# Patient Record
Sex: FEMALE | Race: BLACK OR AFRICAN AMERICAN | NOT HISPANIC OR LATINO
[De-identification: names, ages, dates, MRNs, and addresses within clinical notes are randomized per-mention and may not be internally consistent; named-entity substitution may affect disease eponyms.]

---

## 2017-10-15 PROBLEM — Z00.00 ENCOUNTER FOR PREVENTIVE HEALTH EXAMINATION: Status: ACTIVE | Noted: 2017-10-15

## 2017-11-14 ENCOUNTER — APPOINTMENT (OUTPATIENT)
Dept: DERMATOLOGY | Facility: CLINIC | Age: 57
End: 2017-11-14
Payer: COMMERCIAL

## 2017-11-14 PROCEDURE — 99203 OFFICE O/P NEW LOW 30 MIN: CPT

## 2018-07-12 ENCOUNTER — APPOINTMENT (OUTPATIENT)
Dept: OBGYN | Facility: CLINIC | Age: 58
End: 2018-07-12
Payer: COMMERCIAL

## 2018-07-12 VITALS
HEART RATE: 59 BPM | DIASTOLIC BLOOD PRESSURE: 70 MMHG | SYSTOLIC BLOOD PRESSURE: 138 MMHG | HEIGHT: 61 IN | BODY MASS INDEX: 31.26 KG/M2 | WEIGHT: 165.56 LBS

## 2018-07-12 DIAGNOSIS — Z78.9 OTHER SPECIFIED HEALTH STATUS: ICD-10-CM

## 2018-07-12 DIAGNOSIS — Z83.3 FAMILY HISTORY OF DIABETES MELLITUS: ICD-10-CM

## 2018-07-12 DIAGNOSIS — Z82.49 FAMILY HISTORY OF ISCHEMIC HEART DISEASE AND OTHER DISEASES OF THE CIRCULATORY SYSTEM: ICD-10-CM

## 2018-07-12 PROCEDURE — 99214 OFFICE O/P EST MOD 30 MIN: CPT

## 2018-07-13 LAB
C TRACH RRNA SPEC QL NAA+PROBE: NOT DETECTED
HPV HIGH+LOW RISK DNA PNL CVX: NOT DETECTED
N GONORRHOEA RRNA SPEC QL NAA+PROBE: NOT DETECTED
SOURCE AMPLIFICATION: NORMAL

## 2018-07-16 LAB — CYTOLOGY CVX/VAG DOC THIN PREP: NORMAL

## 2018-08-09 ENCOUNTER — APPOINTMENT (OUTPATIENT)
Dept: OBGYN | Facility: CLINIC | Age: 58
End: 2018-08-09
Payer: COMMERCIAL

## 2018-08-09 ENCOUNTER — RESULT REVIEW (OUTPATIENT)
Age: 58
End: 2018-08-09

## 2018-08-09 ENCOUNTER — ASOB RESULT (OUTPATIENT)
Age: 58
End: 2018-08-09

## 2018-08-09 DIAGNOSIS — N63.20 UNSPECIFIED LUMP IN THE LEFT BREAST, UNSPECIFIED QUADRANT: ICD-10-CM

## 2018-08-09 PROCEDURE — 76857 US EXAM PELVIC LIMITED: CPT

## 2018-08-09 PROCEDURE — 76830 TRANSVAGINAL US NON-OB: CPT

## 2018-08-30 ENCOUNTER — APPOINTMENT (OUTPATIENT)
Dept: OBGYN | Facility: CLINIC | Age: 58
End: 2018-08-30
Payer: COMMERCIAL

## 2018-08-30 VITALS
BODY MASS INDEX: 32.03 KG/M2 | DIASTOLIC BLOOD PRESSURE: 79 MMHG | SYSTOLIC BLOOD PRESSURE: 143 MMHG | HEART RATE: 63 BPM | WEIGHT: 169.5 LBS

## 2018-08-30 DIAGNOSIS — D25.9 LEIOMYOMA OF UTERUS, UNSPECIFIED: ICD-10-CM

## 2018-08-30 DIAGNOSIS — N95.0 POSTMENOPAUSAL BLEEDING: ICD-10-CM

## 2018-08-30 PROCEDURE — 99213 OFFICE O/P EST LOW 20 MIN: CPT

## 2018-09-06 ENCOUNTER — APPOINTMENT (OUTPATIENT)
Dept: OBGYN | Facility: CLINIC | Age: 58
End: 2018-09-06
Payer: COMMERCIAL

## 2018-09-07 ENCOUNTER — OUTPATIENT (OUTPATIENT)
Dept: OUTPATIENT SERVICES | Facility: HOSPITAL | Age: 58
LOS: 1 days | End: 2018-09-07
Payer: COMMERCIAL

## 2018-09-07 VITALS
HEIGHT: 64 IN | SYSTOLIC BLOOD PRESSURE: 151 MMHG | TEMPERATURE: 98 F | RESPIRATION RATE: 16 BRPM | DIASTOLIC BLOOD PRESSURE: 84 MMHG | WEIGHT: 169.76 LBS | HEART RATE: 52 BPM

## 2018-09-07 DIAGNOSIS — Z29.9 ENCOUNTER FOR PROPHYLACTIC MEASURES, UNSPECIFIED: ICD-10-CM

## 2018-09-07 DIAGNOSIS — Z01.818 ENCOUNTER FOR OTHER PREPROCEDURAL EXAMINATION: ICD-10-CM

## 2018-09-07 DIAGNOSIS — N92.0 EXCESSIVE AND FREQUENT MENSTRUATION WITH REGULAR CYCLE: ICD-10-CM

## 2018-09-07 LAB
ANION GAP SERPL CALC-SCNC: 11 MMOL/L — SIGNIFICANT CHANGE UP (ref 5–17)
ANISOCYTOSIS BLD QL: SLIGHT — SIGNIFICANT CHANGE UP
APTT BLD: 25.8 SEC — LOW (ref 27.5–37.4)
BASOPHILS # BLD AUTO: 0 K/UL — SIGNIFICANT CHANGE UP (ref 0–0.2)
BASOPHILS NFR BLD AUTO: 1 % — SIGNIFICANT CHANGE UP (ref 0–2)
BLD GP AB SCN SERPL QL: SIGNIFICANT CHANGE UP
BUN SERPL-MCNC: 14 MG/DL — SIGNIFICANT CHANGE UP (ref 8–20)
CALCIUM SERPL-MCNC: 9 MG/DL — SIGNIFICANT CHANGE UP (ref 8.6–10.2)
CHLORIDE SERPL-SCNC: 105 MMOL/L — SIGNIFICANT CHANGE UP (ref 98–107)
CO2 SERPL-SCNC: 24 MMOL/L — SIGNIFICANT CHANGE UP (ref 22–29)
CREAT SERPL-MCNC: 0.92 MG/DL — SIGNIFICANT CHANGE UP (ref 0.5–1.3)
ELLIPTOCYTES BLD QL SMEAR: SLIGHT — SIGNIFICANT CHANGE UP
EOSINOPHIL # BLD AUTO: 0.2 K/UL — SIGNIFICANT CHANGE UP (ref 0–0.5)
EOSINOPHIL NFR BLD AUTO: 4 % — SIGNIFICANT CHANGE UP (ref 0–5)
GLUCOSE SERPL-MCNC: 102 MG/DL — SIGNIFICANT CHANGE UP (ref 70–115)
HBA1C BLD-MCNC: 5.7 % — HIGH (ref 4–5.6)
HCT VFR BLD CALC: 29.8 % — LOW (ref 37–47)
HGB BLD-MCNC: 8.4 G/DL — LOW (ref 12–16)
HYPOCHROMIA BLD QL: SLIGHT — SIGNIFICANT CHANGE UP
INR BLD: 1.01 RATIO — SIGNIFICANT CHANGE UP (ref 0.88–1.16)
LYMPHOCYTES # BLD AUTO: 1.6 K/UL — SIGNIFICANT CHANGE UP (ref 1–4.8)
LYMPHOCYTES # BLD AUTO: 37 % — SIGNIFICANT CHANGE UP (ref 20–55)
MACROCYTES BLD QL: SLIGHT — SIGNIFICANT CHANGE UP
MCHC RBC-ENTMCNC: 18.6 PG — LOW (ref 27–31)
MCHC RBC-ENTMCNC: 28.2 G/DL — LOW (ref 32–36)
MCV RBC AUTO: 65.9 FL — LOW (ref 81–99)
MICROCYTES BLD QL: SIGNIFICANT CHANGE UP
MONOCYTES # BLD AUTO: 0.4 K/UL — SIGNIFICANT CHANGE UP (ref 0–0.8)
MONOCYTES NFR BLD AUTO: 8 % — SIGNIFICANT CHANGE UP (ref 3–10)
NEUTROPHILS # BLD AUTO: 2.1 K/UL — SIGNIFICANT CHANGE UP (ref 1.8–8)
NEUTROPHILS NFR BLD AUTO: 49 % — SIGNIFICANT CHANGE UP (ref 37–73)
OVALOCYTES BLD QL SMEAR: SLIGHT — SIGNIFICANT CHANGE UP
PLAT MORPH BLD: NORMAL — SIGNIFICANT CHANGE UP
PLATELET # BLD AUTO: 416 K/UL — HIGH (ref 150–400)
POIKILOCYTOSIS BLD QL AUTO: SLIGHT — SIGNIFICANT CHANGE UP
POTASSIUM SERPL-MCNC: 4.2 MMOL/L — SIGNIFICANT CHANGE UP (ref 3.5–5.3)
POTASSIUM SERPL-SCNC: 4.2 MMOL/L — SIGNIFICANT CHANGE UP (ref 3.5–5.3)
PROTHROM AB SERPL-ACNC: 11.1 SEC — SIGNIFICANT CHANGE UP (ref 9.8–12.7)
RBC # BLD: 4.52 M/UL — SIGNIFICANT CHANGE UP (ref 4.4–5.2)
RBC # FLD: 19.8 % — HIGH (ref 11–15.6)
RBC BLD AUTO: ABNORMAL
SCHISTOCYTES BLD QL AUTO: SIGNIFICANT CHANGE UP
SODIUM SERPL-SCNC: 140 MMOL/L — SIGNIFICANT CHANGE UP (ref 135–145)
TYPE + AB SCN PNL BLD: SIGNIFICANT CHANGE UP
VARIANT LYMPHS # BLD: 1 % — SIGNIFICANT CHANGE UP (ref 0–6)
WBC # BLD: 4.3 K/UL — LOW (ref 4.8–10.8)
WBC # FLD AUTO: 4.3 K/UL — LOW (ref 4.8–10.8)

## 2018-09-07 PROCEDURE — 93010 ELECTROCARDIOGRAM REPORT: CPT

## 2018-09-07 RX ORDER — CEFAZOLIN SODIUM 1 G
2000 VIAL (EA) INJECTION ONCE
Qty: 0 | Refills: 0 | Status: DISCONTINUED | OUTPATIENT
Start: 2018-09-11 | End: 2018-09-13

## 2018-09-07 NOTE — H&P PST ADULT - HISTORY OF PRESENT ILLNESS
57 year old female 57 year old female who states that she has heavy menstrual periods and a recent sonogram shows

## 2018-09-07 NOTE — PATIENT PROFILE ADULT. - LEARNING ASSESSMENT (PATIENT) ADDITIONAL COMMENTS
Instructed pt on pre-op instructions, tips for safer surgery, pain management scale, pre-surgical infection prevention, verbalized understanding of all instructions given.

## 2018-09-07 NOTE — H&P PST ADULT - LAB RESULTS AND INTERPRETATION
9-7-18: 8.4/29.8 H& H called and informed Dr. Castillo will type and cross match for 2 units for DOP

## 2018-09-07 NOTE — H&P PST ADULT - NSANTHOSAYNRD_GEN_A_CORE
No. ABIEL screening performed.  STOP BANG Legend: 0-2 = LOW Risk; 3-4 = INTERMEDIATE Risk; 5-8 = HIGH Risk

## 2018-09-07 NOTE — H&P PST ADULT - ASSESSMENT
CAPRINI SCORE [CLOT]    AGE RELATED RISK FACTORS                                                       MOBILITY RELATED FACTORS  [ ] Age 41-60 years                                            (1 Point)                  [ ] Bed rest                                                        (1 Point)  [ ] Age: 61-74 years                                           (2 Points)                 [ ] Plaster cast                                                   (2 Points)  [ ] Age= 75 years                                              (3 Points)                 [ ] Bed bound for more than 72 hours                 (2 Points)    DISEASE RELATED RISK FACTORS                                               GENDER SPECIFIC FACTORS  [ ] Edema in the lower extremities                       (1 Point)                  [ ] Pregnancy                                                     (1 Point)  [ ] Varicose veins                                               (1 Point)                  [ ] Post-partum < 6 weeks                                   (1 Point)             [ ] BMI > 25 Kg/m2                                            (1 Point)                  [ ] Hormonal therapy  or oral contraception          (1 Point)                 [ ] Sepsis (in the previous month)                        (1 Point)                  [ ] History of pregnancy complications                 (1 point)  [ ] Pneumonia or serious lung disease                                               [ ] Unexplained or recurrent                     (1 Point)           (in the previous month)                               (1 Point)  [ ] Abnormal pulmonary function test                     (1 Point)                 SURGERY RELATED RISK FACTORS  [ ] Acute myocardial infarction                              (1 Point)                 [ ]  Section                                             (1 Point)  [ ] Congestive heart failure (in the previous month)  (1 Point)               [ ] Minor surgery                                                  (1 Point)   [ ] Inflammatory bowel disease                             (1 Point)                 [ ] Arthroscopic surgery                                        (2 Points)  [ ] Central venous access                                      (2 Points)                [ ] General surgery lasting more than 45 minutes   (2 Points)       [ ] Stroke (in the previous month)                          (5 Points)               [ ] Elective arthroplasty                                         (5 Points)                                                                                                                                               HEMATOLOGY RELATED FACTORS                                                 TRAUMA RELATED RISK FACTORS  [ ] Prior episodes of VTE                                     (3 Points)                 [ ] Fracture of the hip, pelvis, or leg                       (5 Points)  [ ] Positive family history for VTE                         (3 Points)                 [ ] Acute spinal cord injury (in the previous month)  (5 Points)  [ ] Prothrombin 43037 A                                     (3 Points)                 [ ] Paralysis  (less than 1 month)                             (5 Points)  [ ] Factor V Leiden                                             (3 Points)                  [ ] Multiple Trauma within 1 month                        (5 Points)  [ ] Lupus anticoagulants                                     (3 Points)                                                           [ ] Anticardiolipin antibodies                               (3 Points)                                                       [ ] High homocysteine in the blood                      (3 Points)                                             [ ] Other congenital or acquired thrombophilia      (3 Points)                                                [ ] Heparin induced thrombocytopenia                  (3 Points)                                          Total Score [          ] CAPRINI SCORE [CLOT]    AGE RELATED RISK FACTORS                                                       MOBILITY RELATED FACTORS  [x ] Age 41-60 years                                            (1 Point)                  [ ] Bed rest                                                        (1 Point)  [ ] Age: 61-74 years                                           (2 Points)                 [ ] Plaster cast                                                   (2 Points)  [ ] Age= 75 years                                              (3 Points)                 [ ] Bed bound for more than 72 hours                 (2 Points)    DISEASE RELATED RISK FACTORS                                               GENDER SPECIFIC FACTORS  [ ] Edema in the lower extremities                       (1 Point)                  [ ] Pregnancy                                                     (1 Point)  [ ] Varicose veins                                               (1 Point)                  [ ] Post-partum < 6 weeks                                   (1 Point)             [x ] BMI > 25 Kg/m2                                            (1 Point)                  [ ] Hormonal therapy  or oral contraception          (1 Point)                 [ ] Sepsis (in the previous month)                        (1 Point)                  [ ] History of pregnancy complications                 (1 point)  [ ] Pneumonia or serious lung disease                                               [ ] Unexplained or recurrent                     (1 Point)           (in the previous month)                               (1 Point)  [ ] Abnormal pulmonary function test                     (1 Point)                 SURGERY RELATED RISK FACTORS  [ ] Acute myocardial infarction                              (1 Point)                 [ ]  Section                                             (1 Point)  [ ] Congestive heart failure (in the previous month)  (1 Point)               [ ] Minor surgery                                                  (1 Point)   [ ] Inflammatory bowel disease                             (1 Point)                 [ ] Arthroscopic surgery                                        (2 Points)  [ ] Central venous access                                      (2 Points)                [x ] General surgery lasting more than 45 minutes   (2 Points)       [ ] Stroke (in the previous month)                          (5 Points)               [ ] Elective arthroplasty                                         (5 Points)                                                                                                                                               HEMATOLOGY RELATED FACTORS                                                 TRAUMA RELATED RISK FACTORS  [ ] Prior episodes of VTE                                     (3 Points)                 [ ] Fracture of the hip, pelvis, or leg                       (5 Points)  [ ] Positive family history for VTE                         (3 Points)                 [ ] Acute spinal cord injury (in the previous month)  (5 Points)  [ ] Prothrombin 67076 A                                     (3 Points)                 [ ] Paralysis  (less than 1 month)                             (5 Points)  [ ] Factor V Leiden                                             (3 Points)                  [ ] Multiple Trauma within 1 month                        (5 Points)  [ ] Lupus anticoagulants                                     (3 Points)                                                           [ ] Anticardiolipin antibodies                               (3 Points)                                                       [ ] High homocysteine in the blood                      (3 Points)                                             [ ] Other congenital or acquired thrombophilia      (3 Points)                                                [ ] Heparin induced thrombocytopenia                  (3 Points)                                          Total Score [       4   ]

## 2018-09-10 ENCOUNTER — TRANSCRIPTION ENCOUNTER (OUTPATIENT)
Age: 58
End: 2018-09-10

## 2018-09-11 ENCOUNTER — RESULT REVIEW (OUTPATIENT)
Age: 58
End: 2018-09-11

## 2018-09-11 ENCOUNTER — INPATIENT (INPATIENT)
Facility: HOSPITAL | Age: 58
LOS: 1 days | Discharge: HOME | DRG: 742 | End: 2018-09-13
Attending: OBSTETRICS & GYNECOLOGY | Admitting: OBSTETRICS & GYNECOLOGY
Payer: COMMERCIAL

## 2018-09-11 ENCOUNTER — TRANSCRIPTION ENCOUNTER (OUTPATIENT)
Age: 58
End: 2018-09-11

## 2018-09-11 ENCOUNTER — OTHER (OUTPATIENT)
Age: 58
End: 2018-09-11

## 2018-09-11 ENCOUNTER — APPOINTMENT (OUTPATIENT)
Dept: OBGYN | Facility: HOSPITAL | Age: 58
End: 2018-09-11

## 2018-09-11 VITALS
HEIGHT: 64 IN | DIASTOLIC BLOOD PRESSURE: 64 MMHG | OXYGEN SATURATION: 100 % | HEART RATE: 55 BPM | TEMPERATURE: 98 F | SYSTOLIC BLOOD PRESSURE: 142 MMHG | RESPIRATION RATE: 16 BRPM | WEIGHT: 169.09 LBS

## 2018-09-11 DIAGNOSIS — D25.9 LEIOMYOMA OF UTERUS, UNSPECIFIED: ICD-10-CM

## 2018-09-11 LAB
ABO RH CONFIRMATION: SIGNIFICANT CHANGE UP
GLUCOSE BLDC GLUCOMTR-MCNC: 106 MG/DL — HIGH (ref 70–99)
GLUCOSE BLDC GLUCOMTR-MCNC: 133 MG/DL — HIGH (ref 70–99)
GLUCOSE BLDC GLUCOMTR-MCNC: 141 MG/DL — HIGH (ref 70–99)

## 2018-09-11 PROCEDURE — 83036 HEMOGLOBIN GLYCOSYLATED A1C: CPT

## 2018-09-11 PROCEDURE — 58150 TOTAL HYSTERECTOMY: CPT

## 2018-09-11 PROCEDURE — 36415 COLL VENOUS BLD VENIPUNCTURE: CPT

## 2018-09-11 PROCEDURE — G0463: CPT

## 2018-09-11 PROCEDURE — 88307 TISSUE EXAM BY PATHOLOGIST: CPT | Mod: 26

## 2018-09-11 PROCEDURE — 85027 COMPLETE CBC AUTOMATED: CPT

## 2018-09-11 PROCEDURE — 86901 BLOOD TYPING SEROLOGIC RH(D): CPT

## 2018-09-11 PROCEDURE — 93005 ELECTROCARDIOGRAM TRACING: CPT

## 2018-09-11 PROCEDURE — 80048 BASIC METABOLIC PNL TOTAL CA: CPT

## 2018-09-11 PROCEDURE — 85610 PROTHROMBIN TIME: CPT

## 2018-09-11 PROCEDURE — 85730 THROMBOPLASTIN TIME PARTIAL: CPT

## 2018-09-11 PROCEDURE — 86900 BLOOD TYPING SEROLOGIC ABO: CPT

## 2018-09-11 PROCEDURE — 86923 COMPATIBILITY TEST ELECTRIC: CPT

## 2018-09-11 PROCEDURE — 86850 RBC ANTIBODY SCREEN: CPT

## 2018-09-11 RX ORDER — SODIUM CHLORIDE 9 MG/ML
1000 INJECTION, SOLUTION INTRAVENOUS
Qty: 0 | Refills: 0 | Status: DISCONTINUED | OUTPATIENT
Start: 2018-09-11 | End: 2018-09-12

## 2018-09-11 RX ORDER — SODIUM CHLORIDE 9 MG/ML
1000 INJECTION, SOLUTION INTRAVENOUS
Qty: 0 | Refills: 0 | Status: DISCONTINUED | OUTPATIENT
Start: 2018-09-11 | End: 2018-09-11

## 2018-09-11 RX ORDER — DOCUSATE SODIUM 100 MG
100 CAPSULE ORAL THREE TIMES A DAY
Qty: 0 | Refills: 0 | Status: DISCONTINUED | OUTPATIENT
Start: 2018-09-11 | End: 2018-09-13

## 2018-09-11 RX ORDER — ONDANSETRON 8 MG/1
4 TABLET, FILM COATED ORAL ONCE
Qty: 0 | Refills: 0 | Status: COMPLETED | OUTPATIENT
Start: 2018-09-11 | End: 2018-09-11

## 2018-09-11 RX ORDER — IBUPROFEN 200 MG
400 TABLET ORAL EVERY 6 HOURS
Qty: 0 | Refills: 0 | Status: DISCONTINUED | OUTPATIENT
Start: 2018-09-11 | End: 2018-09-12

## 2018-09-11 RX ORDER — OXYCODONE AND ACETAMINOPHEN 5; 325 MG/1; MG/1
1 TABLET ORAL EVERY 4 HOURS
Qty: 0 | Refills: 0 | Status: DISCONTINUED | OUTPATIENT
Start: 2018-09-11 | End: 2018-09-13

## 2018-09-11 RX ORDER — OXYCODONE AND ACETAMINOPHEN 5; 325 MG/1; MG/1
2 TABLET ORAL EVERY 6 HOURS
Qty: 0 | Refills: 0 | Status: DISCONTINUED | OUTPATIENT
Start: 2018-09-11 | End: 2018-09-13

## 2018-09-11 RX ORDER — HYDRALAZINE HCL 50 MG
10 TABLET ORAL ONCE
Qty: 0 | Refills: 0 | Status: COMPLETED | OUTPATIENT
Start: 2018-09-11 | End: 2018-09-11

## 2018-09-11 RX ORDER — LABETALOL HCL 100 MG
10 TABLET ORAL ONCE
Qty: 0 | Refills: 0 | Status: COMPLETED | OUTPATIENT
Start: 2018-09-11 | End: 2018-09-11

## 2018-09-11 RX ORDER — SODIUM CHLORIDE 9 MG/ML
3 INJECTION INTRAMUSCULAR; INTRAVENOUS; SUBCUTANEOUS EVERY 8 HOURS
Qty: 0 | Refills: 0 | Status: DISCONTINUED | OUTPATIENT
Start: 2018-09-11 | End: 2018-09-11

## 2018-09-11 RX ORDER — FENTANYL CITRATE 50 UG/ML
50 INJECTION INTRAVENOUS
Qty: 0 | Refills: 0 | Status: DISCONTINUED | OUTPATIENT
Start: 2018-09-11 | End: 2018-09-11

## 2018-09-11 RX ORDER — HYDROMORPHONE HYDROCHLORIDE 2 MG/ML
30 INJECTION INTRAMUSCULAR; INTRAVENOUS; SUBCUTANEOUS
Qty: 0 | Refills: 0 | Status: DISCONTINUED | OUTPATIENT
Start: 2018-09-11 | End: 2018-09-13

## 2018-09-11 RX ADMIN — HYDROMORPHONE HYDROCHLORIDE 30 MILLILITER(S): 2 INJECTION INTRAMUSCULAR; INTRAVENOUS; SUBCUTANEOUS at 18:56

## 2018-09-11 RX ADMIN — HYDROMORPHONE HYDROCHLORIDE 30 MILLILITER(S): 2 INJECTION INTRAMUSCULAR; INTRAVENOUS; SUBCUTANEOUS at 16:44

## 2018-09-11 RX ADMIN — Medication 10 MILLIGRAM(S): at 17:15

## 2018-09-11 RX ADMIN — ONDANSETRON 4 MILLIGRAM(S): 8 TABLET, FILM COATED ORAL at 16:32

## 2018-09-11 RX ADMIN — HYDROMORPHONE HYDROCHLORIDE 30 MILLILITER(S): 2 INJECTION INTRAMUSCULAR; INTRAVENOUS; SUBCUTANEOUS at 19:20

## 2018-09-11 RX ADMIN — Medication 10 MILLIGRAM(S): at 17:30

## 2018-09-11 NOTE — DISCHARGE NOTE ADULT - MEDICATION SUMMARY - MEDICATIONS TO TAKE
I will START or STAY ON the medications listed below when I get home from the hospital:    losartan 25 mg oral tablet  -- 1 tab(s) by mouth once a day  -- Indication: For Hypertension, unspecified type

## 2018-09-11 NOTE — BRIEF OPERATIVE NOTE - PROCEDURE
<<-----Click on this checkbox to enter Procedure Hysterectomy, abdominal, with salpingo-oophorectomy  09/11/2018  With Bilateral salpingooophorectomy  Active  ANGELA

## 2018-09-11 NOTE — DISCHARGE NOTE ADULT - CARE PROVIDER_API CALL
Valdemar Castillo), Obstetrics and Gynecology  [address] [place] [address]  Phone: [phone]  Fax: [phone]

## 2018-09-11 NOTE — BRIEF OPERATIVE NOTE - POST-OP DX
Intramural, submucous, and subserous leiomyoma of uterus  09/11/2018    Active  Valdemar Castillo  Menorrhagia with irregular cycle  09/11/2018    Active  Valdemar Castillo  Pelvic pain in female  09/11/2018    Active  Valdemar Castillo

## 2018-09-11 NOTE — DISCHARGE NOTE ADULT - PATIENT PORTAL LINK FT
You can access the [facility], offered by [facility], by registering with the following website: [email or web address]

## 2018-09-12 DIAGNOSIS — I10 ESSENTIAL (PRIMARY) HYPERTENSION: ICD-10-CM

## 2018-09-12 DIAGNOSIS — D25.1 INTRAMURAL LEIOMYOMA OF UTERUS: ICD-10-CM

## 2018-09-12 DIAGNOSIS — I16.0 HYPERTENSIVE URGENCY: ICD-10-CM

## 2018-09-12 DIAGNOSIS — Z90.710 ACQUIRED ABSENCE OF BOTH CERVIX AND UTERUS: ICD-10-CM

## 2018-09-12 DIAGNOSIS — G43.919 MIGRAINE, UNSPECIFIED, INTRACTABLE, WITHOUT STATUS MIGRAINOSUS: ICD-10-CM

## 2018-09-12 DIAGNOSIS — R00.1 BRADYCARDIA, UNSPECIFIED: ICD-10-CM

## 2018-09-12 LAB
ANISOCYTOSIS BLD QL: SLIGHT — SIGNIFICANT CHANGE UP
BASOPHILS # BLD AUTO: 0 K/UL — SIGNIFICANT CHANGE UP (ref 0–0.2)
BASOPHILS NFR BLD AUTO: 0.1 % — SIGNIFICANT CHANGE UP (ref 0–2)
ELLIPTOCYTES BLD QL SMEAR: SLIGHT — SIGNIFICANT CHANGE UP
EOSINOPHIL # BLD AUTO: 0 K/UL — SIGNIFICANT CHANGE UP (ref 0–0.5)
EOSINOPHIL NFR BLD AUTO: 0 % — SIGNIFICANT CHANGE UP (ref 0–6)
HCT VFR BLD CALC: 33.3 % — LOW (ref 37–47)
HGB BLD-MCNC: 9.4 G/DL — LOW (ref 12–16)
HYPOCHROMIA BLD QL: SLIGHT — SIGNIFICANT CHANGE UP
LYMPHOCYTES # BLD AUTO: 0.6 K/UL — LOW (ref 1–4.8)
LYMPHOCYTES # BLD AUTO: 6.1 % — LOW (ref 20–55)
MACROCYTES BLD QL: SLIGHT — SIGNIFICANT CHANGE UP
MCHC RBC-ENTMCNC: 18.5 PG — LOW (ref 27–31)
MCHC RBC-ENTMCNC: 28.2 G/DL — LOW (ref 32–36)
MCV RBC AUTO: 65.7 FL — LOW (ref 81–99)
MICROCYTES BLD QL: SLIGHT — SIGNIFICANT CHANGE UP
MONOCYTES # BLD AUTO: 0.6 K/UL — SIGNIFICANT CHANGE UP (ref 0–0.8)
MONOCYTES NFR BLD AUTO: 5.7 % — SIGNIFICANT CHANGE UP (ref 3–10)
NEUTROPHILS # BLD AUTO: 8.7 K/UL — HIGH (ref 1.8–8)
NEUTROPHILS NFR BLD AUTO: 87.9 % — HIGH (ref 37–73)
OVALOCYTES BLD QL SMEAR: SLIGHT — SIGNIFICANT CHANGE UP
PLAT MORPH BLD: NORMAL — SIGNIFICANT CHANGE UP
PLATELET # BLD AUTO: 405 K/UL — HIGH (ref 150–400)
POIKILOCYTOSIS BLD QL AUTO: SLIGHT — SIGNIFICANT CHANGE UP
RBC # BLD: 5.07 M/UL — SIGNIFICANT CHANGE UP (ref 4.4–5.2)
RBC # FLD: 19 % — HIGH (ref 11–15.6)
RBC BLD AUTO: ABNORMAL
SCHISTOCYTES BLD QL AUTO: SLIGHT — SIGNIFICANT CHANGE UP
WBC # BLD: 9.9 K/UL — SIGNIFICANT CHANGE UP (ref 4.8–10.8)
WBC # FLD AUTO: 9.9 K/UL — SIGNIFICANT CHANGE UP (ref 4.8–10.8)

## 2018-09-12 PROCEDURE — 93010 ELECTROCARDIOGRAM REPORT: CPT

## 2018-09-12 PROCEDURE — 99222 1ST HOSP IP/OBS MODERATE 55: CPT

## 2018-09-12 RX ORDER — HYDRALAZINE HCL 50 MG
10 TABLET ORAL ONCE
Qty: 0 | Refills: 0 | Status: COMPLETED | OUTPATIENT
Start: 2018-09-12 | End: 2018-09-12

## 2018-09-12 RX ORDER — LOSARTAN POTASSIUM 100 MG/1
25 TABLET, FILM COATED ORAL DAILY
Qty: 0 | Refills: 0 | Status: DISCONTINUED | OUTPATIENT
Start: 2018-09-13 | End: 2018-09-13

## 2018-09-12 RX ORDER — HYDRALAZINE HCL 50 MG
10 TABLET ORAL EVERY 6 HOURS
Qty: 0 | Refills: 0 | Status: DISCONTINUED | OUTPATIENT
Start: 2018-09-12 | End: 2018-09-12

## 2018-09-12 RX ORDER — LABETALOL HCL 100 MG
300 TABLET ORAL EVERY 8 HOURS
Qty: 0 | Refills: 0 | Status: DISCONTINUED | OUTPATIENT
Start: 2018-09-12 | End: 2018-09-12

## 2018-09-12 RX ORDER — HYDRALAZINE HCL 50 MG
20 TABLET ORAL ONCE
Qty: 0 | Refills: 0 | Status: COMPLETED | OUTPATIENT
Start: 2018-09-12 | End: 2018-09-12

## 2018-09-12 RX ORDER — LOSARTAN POTASSIUM 100 MG/1
25 TABLET, FILM COATED ORAL ONCE
Qty: 0 | Refills: 0 | Status: COMPLETED | OUTPATIENT
Start: 2018-09-12 | End: 2018-09-12

## 2018-09-12 RX ORDER — HYDRALAZINE HCL 50 MG
50 TABLET ORAL EVERY 8 HOURS
Qty: 0 | Refills: 0 | Status: DISCONTINUED | OUTPATIENT
Start: 2018-09-12 | End: 2018-09-12

## 2018-09-12 RX ORDER — HYDRALAZINE HCL 50 MG
25 TABLET ORAL EVERY 6 HOURS
Qty: 0 | Refills: 0 | Status: DISCONTINUED | OUTPATIENT
Start: 2018-09-12 | End: 2018-09-13

## 2018-09-12 RX ADMIN — Medication 50 MILLIGRAM(S): at 13:30

## 2018-09-12 RX ADMIN — Medication 10 MILLIGRAM(S): at 01:37

## 2018-09-12 RX ADMIN — OXYCODONE AND ACETAMINOPHEN 2 TABLET(S): 5; 325 TABLET ORAL at 17:34

## 2018-09-12 RX ADMIN — Medication 20 MILLIGRAM(S): at 06:47

## 2018-09-12 RX ADMIN — Medication 300 MILLIGRAM(S): at 08:00

## 2018-09-12 RX ADMIN — Medication 400 MILLIGRAM(S): at 04:59

## 2018-09-12 RX ADMIN — OXYCODONE AND ACETAMINOPHEN 2 TABLET(S): 5; 325 TABLET ORAL at 07:15

## 2018-09-12 RX ADMIN — OXYCODONE AND ACETAMINOPHEN 2 TABLET(S): 5; 325 TABLET ORAL at 17:04

## 2018-09-12 RX ADMIN — Medication 10 MILLIGRAM(S): at 08:06

## 2018-09-12 RX ADMIN — OXYCODONE AND ACETAMINOPHEN 2 TABLET(S): 5; 325 TABLET ORAL at 06:32

## 2018-09-12 RX ADMIN — Medication 10 MILLIGRAM(S): at 00:37

## 2018-09-12 RX ADMIN — Medication 400 MILLIGRAM(S): at 05:54

## 2018-09-12 RX ADMIN — LOSARTAN POTASSIUM 25 MILLIGRAM(S): 100 TABLET, FILM COATED ORAL at 17:32

## 2018-09-12 RX ADMIN — Medication 100 MILLIGRAM(S): at 08:06

## 2018-09-12 NOTE — PROGRESS NOTE ADULT - PROBLEM SELECTOR PLAN 2
- ordered Hydralazine 25mg PO  - Will continue optimize pain control   - will transfer to [facility] management - ordered Hydralazine 25mg PO  - Will continue optimize pain control

## 2018-09-12 NOTE — CONSULT NOTE ADULT - PROBLEM SELECTOR RECOMMENDATION 9
Pt with hypertensive urgency likely precipitated from high rate IVF and pain from migraine and surgery.   -Stop IV fluids now and d/c hydralazine 10mg q6hr for now  -After IVF stopped, reassess BP in 2-3 hours, anticipate that BP should improve but if persistently elevated (SBP >180) can start standing hydralazine 25mg q 8hr  -Check EKG and BMP to ensure no signs of end organ damage  -Ensure adequate pain control Pt with hypertensive urgency likely precipitated from high rate IVF and pain from migraine and surgery.   -Stop IV fluids now and d/c hydralazine 10mg q6hr for now  -After IVF stopped, reassess BP in 2-3 hours, anticipate that BP should improve but if persistently elevated (SBP >180) can start standing hydralazine 25mg q 8hr and increased as indicated  -Check EKG and BMP to ensure no signs of end organ damage  -Ensure adequate pain control

## 2018-09-12 NOTE — PROGRESS NOTE ADULT - PROBLEM SELECTOR PLAN 4
- asymptomatic , will d/c Labetalol , observe .    Problem / Plan - 5: Headache - patient state headache was present prior OR , similar to previous migraines attacks - got worst after OR - now resolved . Will add Fiorocet PRN - observe .

## 2018-09-12 NOTE — PROGRESS NOTE ADULT - SUBJECTIVE AND OBJECTIVE BOX
56 y/o HD#1  POD#1 s/p SANDRA and bilateral salpingo-oopherectomy.    S:  Patient was seen and examined at bedside. Patient is reporting  regular diet, denies N/V. Lizama has been removed and patient has been urinating. Patient has been ambulating. Denies flatus or BM.    O:   T(C): 36.8 (09-12-18 @ 00:15), Max: 36.8 (09-12-18 @ 00:15)  HR: 53 (09-12-18 @ 04:44) (46 - 97)  BP: 172/73 (09-12-18 @ 04:44) (142/64 - 197/81)  RR: 20 (09-12-18 @ 04:44) (12 - 20)  SpO2: 97% (09-12-18 @ 04:44) (96% - 100%)    CV: RRR  Abdomen: soft, nontender, + BS   Incision: dressing removed, clean dry and intact          09-11-18 @ 07:01  -  09-12-18 @ 06:32  --------------------------------------------------------  IN: 1200 mL / OUT: 4025 mL / NET: -2825 mL          ceFAZolin   IVPB 2000 milliGRAM(s) IV Intermittent once  docusate sodium 100 milliGRAM(s) Oral three times a day PRN  hydrALAZINE 10 milliGRAM(s) Oral every 6 hours  HYDROmorphone PCA (1 mG/mL) 30 milliLiter(s) PCA Continuous PCA Continuous  ibuprofen  Tablet. 400 milliGRAM(s) Oral every 6 hours PRN  oxyCODONE    5 mG/acetaminophen 325 mG 1 Tablet(s) Oral every 4 hours PRN  oxyCODONE    5 mG/acetaminophen 325 mG 2 Tablet(s) Oral every 6 hours PRN        A/P   HD# POD# admitted for   -Stable   -Continue post operative care  -: voiding spontaneously  -GI: BM and flatus present  -DVT ppx: SCDs  -D/C to home on POD# 2, 3 or 4 if meets criteria 58 y/o HD#1  POD#1 s/p SANDRA and bilateral salpingo-oopherectomy.    S:  Patient was seen and examined at bedside. Patient is reporting 5/10 general pain and a headache all night. Tolerating regular diet, denies N/V. Lizama has been removed and patient has not urinated yet. Patient has not ambulated yet. Denies flatus or BM. Denies vaginal bleeding.     O:   T(C): 36.8 (09-12-18 @ 00:15), Max: 36.8 (09-12-18 @ 00:15)  HR: 53 (09-12-18 @ 04:44) (46 - 97)  BP: 172/73 (09-12-18 @ 04:44) (142/64 - 197/81)  RR: 20 (09-12-18 @ 04:44) (12 - 20)  SpO2: 97% (09-12-18 @ 04:44) (96% - 100%)    Abdomen: soft, nontender,   Incision: dressing removed, clean dry and intact          09-11-18 @ 07:01  -  09-12-18 @ 06:32  --------------------------------------------------------  IN: 1200 mL / OUT: 4025 mL / NET: -2825 mL          ceFAZolin   IVPB 2000 milliGRAM(s) IV Intermittent once  docusate sodium 100 milliGRAM(s) Oral three times a day PRN  hydrALAZINE 10 milliGRAM(s) Oral every 6 hours  HYDROmorphone PCA (1 mG/mL) 30 milliLiter(s) PCA Continuous PCA Continuous  ibuprofen  Tablet. 400 milliGRAM(s) Oral every 6 hours PRN  oxyCODONE    5 mG/acetaminophen 325 mG 1 Tablet(s) Oral every 4 hours PRN  oxyCODONE    5 mG/acetaminophen 325 mG 2 Tablet(s) Oral every 6 hours PRN

## 2018-09-12 NOTE — PROGRESS NOTE ADULT - SUBJECTIVE AND OBJECTIVE BOX
Patient seen and examined . S/p hysterectomy with BSO  , POD # 1 . Post op complicated with worsening of headache  and hypertensive urgency . This am patient state headache is gone , no other complaints . Mild abdominal pain at surgical site +     CC: uterine fibrinoid , s/p SANDRA with BSO , HA - resolved , hypertensive episode - BP resolving     HPI:  57F hx migraine headache and fibroids who was admitted to GYN service for leiomyoma removal.  Pt underwent uncomplicated hysterectomy on 9/11.  Medicine consult called for persistent hypertension with SBP peaking into the 180s.  Pt reporting headache located in her forehead and behind her eyes that started since the day of her surgery and has been persistent.  Headache quality similar to her migraines.  Pt denies any chest pain, dyspnea.  Pt endorses lower abdominal pain in the area of her surgery.     PAST MEDICAL & SURGICAL HISTORY:  No pertinent past medical history  No significant past surgical history      MEDICATIONS  (STANDING):  bisacodyl Suppository 10 milliGRAM(s) Rectal daily  ceFAZolin   IVPB 2000 milliGRAM(s) IV Intermittent once  hydrALAZINE 50 milliGRAM(s) Oral every 8 hours  HYDROmorphone PCA (1 mG/mL) 30 milliLiter(s) PCA Continuous PCA Continuous    MEDICATIONS  (PRN):  acetaminophen 325 mG/butalbital 50 mG/caffeine 40 mG 1 Tablet(s) Oral every 8 hours PRN for headaches  docusate sodium 100 milliGRAM(s) Oral three times a day PRN Stool Softening  oxyCODONE    5 mG/acetaminophen 325 mG 1 Tablet(s) Oral every 4 hours PRN Moderate Pain (4 - 6)  oxyCODONE    5 mG/acetaminophen 325 mG 2 Tablet(s) Oral every 6 hours PRN Severe Pain (7 - 10)      LABS:                          9.4    9.9   )-----------( 405      ( 12 Sep 2018 07:33 )             33.3         RADIOLOGY & ADDITIONAL TESTS:    EKG - NSR - 49 bpm , sinus arrythmia       REVIEW OF SYSTEMS:    Headache well controlled , mild abdominal pain at surgical site ,  all other systems reviewed and are negative         Vital Signs Last 24 Hrs  T(C): 36.9 (12 Sep 2018 08:35), Max: 36.9 (12 Sep 2018 08:35)  T(F): 98.5 (12 Sep 2018 08:35), Max: 98.5 (12 Sep 2018 08:35)  HR: 55 (12 Sep 2018 08:35) (46 - 97)  BP: 140/74 (12 Sep 2018 09:37) (140/74 - 197/81)  BP(mean): --  RR: 20 (12 Sep 2018 08:35) (12 - 20)  SpO2: 97% (12 Sep 2018 06:30) (96% - 100%)  PHYSICAL EXAM:    GENERAL: NAD, well-groomed, well-developed  HEAD:  Atraumatic, Normocephalic  EYES: EOMI, PERRLA, conjunctiva and sclera clear  NECK: Supple, No JVD, Normal thyroid  NERVOUS SYSTEM:  Alert & Oriented X3, no focal deficit  CHEST/LUNG: CTA b/l ,  no  rales, rhonchi, wheezing, or rubs  HEART: Regular rate and rhythm; No murmurs, rubs, or gallops  ABDOMEN: Soft, Nontender, Nondistended; Bowel sounds present , incision site - dry and clean   EXTREMITIES:  2+ Peripheral Pulses, No clubbing, cyanosis, or edema  LYMPH: No lymphadenopathy noted  SKIN: No rashes or lesions

## 2018-09-12 NOTE — CONSULT NOTE ADULT - SUBJECTIVE AND OBJECTIVE BOX
Patient is a 57y old  Female who presents with a chief complaint of SANDRA/ BSO (11 Sep 2018 19:44)    HPI:  57F hx migraine headache and fibroids who was admitted to GYN service for leiomyoma removal.  Pt underwent uncomplicated hysterectomy on 9/11.  Medicine consult called for persistent hypertension with SBP peaking into the 180s.  Pt reporting headache located in her forehead and behind her eyes that started since the day of her surgery and has been persistent.  Headache quality similar to her migraines.  Pt denies any chest pain, dyspnea.  Pt endorses lower abdominal pain in the area of her surgery.     REVIEW OF SYSTEMS:  GENERAL: No fevers or chills  RESPIRATORY:  No cough or shortness of breath  CARDIAC:  No chest pain or palpitations  ABDOMEN: No abdominal pain, nausea, vomiting, or bowel changes  GENITOURINARY:  No urinary frequency or dysuria  NEUROLOGIC: +HEADACHE or focal weakness  PSYCHIATRIC: No anxiety or behavioral disturbance  SKIN: No rash or lesions    CURRENT MEDICATIONS  (STANDING):  ceFAZolin   IVPB 2000 milliGRAM(s) IV Intermittent once  hydrALAZINE 10 milliGRAM(s) Oral every 6 hours  HYDROmorphone PCA (1 mG/mL) 30 milliLiter(s) PCA Continuous PCA Continuous  lactated ringers. 1000 milliLiter(s) (125 mL/Hr) IV Continuous <Continuous>    MEDICATIONS  (PRN):  docusate sodium 100 milliGRAM(s) Oral three times a day PRN Stool Softening  ibuprofen  Tablet. 400 milliGRAM(s) Oral every 6 hours PRN Moderate Pain (4 - 6)  oxyCODONE    5 mG/acetaminophen 325 mG 1 Tablet(s) Oral every 4 hours PRN Moderate Pain (4 - 6)  oxyCODONE    5 mG/acetaminophen 325 mG 2 Tablet(s) Oral every 6 hours PRN Severe Pain (7 - 10)      ALLERGIES:  No Known Allergies  Intolerances    FAMILY HX:   HTN- mother    SOCIAL HX:  , lives with  and kids, denies tobacco and alcohol use    VITALS:  T(C): 36.8 (09-12-18 @ 00:15), Max: 36.8 (09-12-18 @ 00:15)  HR: 54 (09-12-18 @ 02:24) (46 - 97)  BP: 182/80 (09-12-18 @ 02:38) (142/64 - 197/81)  RR: 20 (09-12-18 @ 02:24) (12 - 20)  SpO2: 97% (09-12-18 @ 02:24) (96% - 100%)  Wt(kg): --Vital Signs Last 24 Hrs  T(C): 36.8 (12 Sep 2018 00:15), Max: 36.8 (12 Sep 2018 00:15)  T(F): 98.3 (12 Sep 2018 00:15), Max: 98.3 (12 Sep 2018 00:15)  HR: 54 (12 Sep 2018 02:24) (46 - 97)  BP: 182/80 (12 Sep 2018 02:38) (142/64 - 197/81)  BP(mean): --  RR: 20 (12 Sep 2018 02:24) (12 - 20)  SpO2: 97% (12 Sep 2018 02:24) (96% - 100%)  I&O's Summary    11 Sep 2018 07:01  -  12 Sep 2018 03:28  --------------------------------------------------------  IN: 1200 mL / OUT: 2925 mL / NET: -1725 mL    PHYSICAL EXAM:  GENERAL:  Well-appearing, not in acute distress  EYES:  Clear conjuctiva, pupils reactive to light  ENT: Moist mucous membranes  LUNG:  Non-labored breathing pattern, lungs clear to ausculation in anterior fields  CV: Regular rate and rhtyhm, no murmurs appreciated, no lower extremity edema  ABD: Soft, clean and dry dressing over lower abdomen with tenderness to palpation in that area, non-distended  NEURO: Awake, alert, conversant, upper and lower extremity strength 5/5, light touch sensation grossly intact  PSYCH: Calm, cooperative  SKIN: No rash or lesions    Consultant(s) Notes Reviewed:  [x ] YES  [ ] NO  Care Discussed with Consultants/Other Providers [ x] YES  [ ] NO    LABS:  None available        CAPILLARY BLOOD GLUCOSE  POCT Blood Glucose.: 141 mg/dL (11 Sep 2018 16:26)  POCT Blood Glucose.: 133 mg/dL (11 Sep 2018 14:53)  POCT Blood Glucose.: 106 mg/dL (11 Sep 2018 11:07) Patient is a 57y old  Female who presents with a chief complaint of SANDRA/ BSO (11 Sep 2018 19:44)    HPI:  57F hx migraine headache and fibroids who was admitted to GYN service for leiomyoma removal.  Pt underwent uncomplicated hysterectomy on 9/11.  Medicine consult called for persistent hypertension with SBP peaking into the 180s.  Pt reporting headache located in her forehead and behind her eyes that started since the day of her surgery and has been persistent.  Headache quality similar to her migraines.  Pt denies any chest pain, dyspnea.  Pt endorses lower abdominal pain in the area of her surgery.     REVIEW OF SYSTEMS:  GENERAL: No fevers or chills  RESPIRATORY:  No cough or shortness of breath  CARDIAC:  No chest pain or palpitations  ABDOMEN: No abdominal pain, nausea, vomiting, or bowel changes  GENITOURINARY:  No urinary frequency or dysuria  NEUROLOGIC: +HEADACHE or focal weakness  PSYCHIATRIC: No anxiety or behavioral disturbance  SKIN: No rash or lesions  ALL OTHER ROS negative     CURRENT MEDICATIONS  (STANDING):  ceFAZolin   IVPB 2000 milliGRAM(s) IV Intermittent once  hydrALAZINE 10 milliGRAM(s) Oral every 6 hours  HYDROmorphone PCA (1 mG/mL) 30 milliLiter(s) PCA Continuous PCA Continuous  lactated ringers. 1000 milliLiter(s) (125 mL/Hr) IV Continuous <Continuous>    MEDICATIONS  (PRN):  docusate sodium 100 milliGRAM(s) Oral three times a day PRN Stool Softening  ibuprofen  Tablet. 400 milliGRAM(s) Oral every 6 hours PRN Moderate Pain (4 - 6)  oxyCODONE    5 mG/acetaminophen 325 mG 1 Tablet(s) Oral every 4 hours PRN Moderate Pain (4 - 6)  oxyCODONE    5 mG/acetaminophen 325 mG 2 Tablet(s) Oral every 6 hours PRN Severe Pain (7 - 10)      ALLERGIES:  No Known Allergies  Intolerances    FAMILY HX:   HTN- mother    SOCIAL HX:  , lives with  and kids, denies tobacco and alcohol use    VITALS:  T(C): 36.8 (09-12-18 @ 00:15), Max: 36.8 (09-12-18 @ 00:15)  HR: 54 (09-12-18 @ 02:24) (46 - 97)  BP: 182/80 (09-12-18 @ 02:38) (142/64 - 197/81)  RR: 20 (09-12-18 @ 02:24) (12 - 20)  SpO2: 97% (09-12-18 @ 02:24) (96% - 100%)  Wt(kg): --Vital Signs Last 24 Hrs  T(C): 36.8 (12 Sep 2018 00:15), Max: 36.8 (12 Sep 2018 00:15)  T(F): 98.3 (12 Sep 2018 00:15), Max: 98.3 (12 Sep 2018 00:15)  HR: 54 (12 Sep 2018 02:24) (46 - 97)  BP: 182/80 (12 Sep 2018 02:38) (142/64 - 197/81)  BP(mean): --  RR: 20 (12 Sep 2018 02:24) (12 - 20)  SpO2: 97% (12 Sep 2018 02:24) (96% - 100%)  I&O's Summary    11 Sep 2018 07:01  -  12 Sep 2018 03:28  --------------------------------------------------------  IN: 1200 mL / OUT: 2925 mL / NET: -1725 mL    PHYSICAL EXAM:  GENERAL:  Well-appearing, not in acute distress  EYES:  Clear conjuctiva, pupils reactive to light  ENT: Moist mucous membranes  LUNG:  Non-labored breathing pattern, lungs clear to ausculation in anterior fields  CV: Regular rate and rhtyhm, no murmurs appreciated, no lower extremity edema  ABD: Soft, clean and dry dressing over lower abdomen with tenderness to palpation in that area, non-distended  NEURO: Awake, alert, conversant, upper and lower extremity strength 5/5, light touch sensation grossly intact  PSYCH: Calm, cooperative  SKIN: No rash or lesions      Consultant(s) Notes Reviewed:  [x ] YES  [ ] NO  Care Discussed with Consultants/Other Providers [ x] YES  [ ] NO    LABS:  None available        CAPILLARY BLOOD GLUCOSE  POCT Blood Glucose.: 141 mg/dL (11 Sep 2018 16:26)  POCT Blood Glucose.: 133 mg/dL (11 Sep 2018 14:53)  POCT Blood Glucose.: 106 mg/dL (11 Sep 2018 11:07)

## 2018-09-12 NOTE — PROGRESS NOTE ADULT - SUBJECTIVE AND OBJECTIVE BOX
This is a 57y woman who is s/p: Uterine leiomyoma  Hysterectomy, abdominal, with salpingo-oophorectomy      She is now postoperative day:1    Subjective:  The patient feels well, c/o headaches.  She is not ambulating and yet to start tolerating  diet  She is not yet having bowel movements and flatus  She reports no breathing problems  She reports no visual changes    Physical exam:  She generally looks and feels better.    Vital Signs Last 24 Hrs  T(C): 36.8 (12 Sep 2018 00:15), Max: 36.8 (12 Sep 2018 00:15)  T(F): 98.3 (12 Sep 2018 00:15), Max: 98.3 (12 Sep 2018 00:15)  HR: 53 (12 Sep 2018 04:44) (46 - 97)  BP: 172/82 (12 Sep 2018 07:21) (142/64 - 197/81)  BP(mean): --  RR: 18 (12 Sep 2018 06:30) (12 - 20)  SpO2: 97% (12 Sep 2018 06:30) (96% - 100%)    Lungs: Normal  Heart: Regular rate and rhythm  Abdomen: Soft, nontender, no distension , the incision is clean dry and intact  Pelvic: Normal vaginal spotting noted  Ext: No DVT signs, warm extremities, normal pulses    LABS:                Allergies    No Known Allergies    Intolerances      MEDICATIONS  (STANDING):  bisacodyl Suppository 10 milliGRAM(s) Rectal daily  ceFAZolin   IVPB 2000 milliGRAM(s) IV Intermittent once  hydrALAZINE 10 milliGRAM(s) Oral every 6 hours  HYDROmorphone PCA (1 mG/mL) 30 milliLiter(s) PCA Continuous PCA Continuous  labetalol 300 milliGRAM(s) Oral every 8 hours    MEDICATIONS  (PRN):  docusate sodium 100 milliGRAM(s) Oral three times a day PRN Stool Softening  ibuprofen  Tablet. 400 milliGRAM(s) Oral every 6 hours PRN Moderate Pain (4 - 6)  oxyCODONE    5 mG/acetaminophen 325 mG 1 Tablet(s) Oral every 4 hours PRN Moderate Pain (4 - 6)  oxyCODONE    5 mG/acetaminophen 325 mG 2 Tablet(s) Oral every 6 hours PRN Severe Pain (7 - 10)      RADIOLOGY & ADDITIONAL TESTS:    Assessment and Plan  Abdominal Hysterectomy Day:  She feels well  Continue the current pain medication  Encourage ISS & Ambulation  Encourage regular diet   DVT ppx: SCDs when not ambulating  She is stable, tolerates a diet and has normal flatus and bowel movements  She will be discharged on Day 2,3 or 4   according to the normal criteria.

## 2018-09-12 NOTE — PROGRESS NOTE ADULT - PROBLEM SELECTOR PLAN 1
-Continue post operative care  -: voiding spontaneously  -GI: BM and flatus present  -DVT ppx: SCDs  -D/C to home on POD# 2, 3 or 4 if meets criteria

## 2018-09-12 NOTE — CONSULT NOTE ADULT - ASSESSMENT
57F hx migraine headache and fibroids who was admitted to GYN service for leiomyoma removal with course complicated by persistent hypertension, medicine consulted for hypertension

## 2018-09-12 NOTE — PROGRESS NOTE ADULT - PROBLEM SELECTOR PLAN 1
- post op , BP better controlled this am . EKG noted with bradycardia at 49 bpm - will d/c Labetalol . Patient never dx with HTN in the past , family hx of HTN in sister / mother ( at 50's ) - headache resolved , no sob , no chest pain , no blurry vision .  Likely Hypertension caused by HA , NS fluids stress . Will change diet to ADA diet . If continue to be Hypertensive may add ACE / ARB's , avoid NSAID's

## 2018-09-12 NOTE — PROGRESS NOTE ADULT - PROBLEM SELECTOR PLAN 3
- will continue to optimize pain control, ordered Percocet   - will transfer to [facility] for management - will continue to optimize pain control, ordered Percocet

## 2018-09-13 VITALS
RESPIRATION RATE: 20 BRPM | TEMPERATURE: 99 F | SYSTOLIC BLOOD PRESSURE: 138 MMHG | HEART RATE: 62 BPM | DIASTOLIC BLOOD PRESSURE: 77 MMHG

## 2018-09-13 DIAGNOSIS — D50.0 IRON DEFICIENCY ANEMIA SECONDARY TO BLOOD LOSS (CHRONIC): ICD-10-CM

## 2018-09-13 PROCEDURE — 88307 TISSUE EXAM BY PATHOLOGIST: CPT

## 2018-09-13 PROCEDURE — 36415 COLL VENOUS BLD VENIPUNCTURE: CPT

## 2018-09-13 PROCEDURE — 85027 COMPLETE CBC AUTOMATED: CPT

## 2018-09-13 PROCEDURE — 93005 ELECTROCARDIOGRAM TRACING: CPT

## 2018-09-13 PROCEDURE — 12345: CPT | Mod: NC

## 2018-09-13 PROCEDURE — 82962 GLUCOSE BLOOD TEST: CPT

## 2018-09-13 RX ORDER — HYDRALAZINE HCL 50 MG
25 TABLET ORAL EVERY 8 HOURS
Qty: 0 | Refills: 0 | Status: DISCONTINUED | OUTPATIENT
Start: 2018-09-13 | End: 2018-09-13

## 2018-09-13 RX ORDER — HYDRALAZINE HCL 50 MG
25 TABLET ORAL ONCE
Qty: 0 | Refills: 0 | Status: DISCONTINUED | OUTPATIENT
Start: 2018-09-13 | End: 2018-09-13

## 2018-09-13 RX ORDER — HYDRALAZINE HCL 50 MG
1 TABLET ORAL
Qty: 30 | Refills: 0 | OUTPATIENT
Start: 2018-09-13 | End: 2018-10-12

## 2018-09-13 RX ORDER — LOSARTAN POTASSIUM 100 MG/1
1 TABLET, FILM COATED ORAL
Qty: 0 | Refills: 0 | COMMUNITY
Start: 2018-09-13

## 2018-09-13 RX ADMIN — Medication 10 MILLIGRAM(S): at 08:10

## 2018-09-13 RX ADMIN — LOSARTAN POTASSIUM 25 MILLIGRAM(S): 100 TABLET, FILM COATED ORAL at 06:01

## 2018-09-13 RX ADMIN — OXYCODONE AND ACETAMINOPHEN 2 TABLET(S): 5; 325 TABLET ORAL at 04:58

## 2018-09-13 RX ADMIN — Medication 25 MILLIGRAM(S): at 04:58

## 2018-09-13 RX ADMIN — OXYCODONE AND ACETAMINOPHEN 2 TABLET(S): 5; 325 TABLET ORAL at 05:45

## 2018-09-13 NOTE — PROGRESS NOTE ADULT - PROBLEM SELECTOR PLAN 1
-Continue post operative care  -: voiding spontaneously  -GI: BM and flatus present  -DVT ppx: SCDs  -D/C to home on POD# 2, 3 or 4 if meets criteria -Continue post operative care  -: voiding spontaneously  -GI: BM and flatus not present  -DVT ppx: SCDs  -D/C to home on POD# 2, 3 or 4 if meets criteria

## 2018-09-13 NOTE — PROGRESS NOTE ADULT - PROBLEM SELECTOR PROBLEM 3
Anemia due to blood loss
S/P hysterectomy with oophorectomy
Intractable migraine without status migrainosus, unspecified migraine type

## 2018-09-13 NOTE — PROGRESS NOTE ADULT - REASON FOR ADMISSION
hysterectomy and bilateral salpingectomy
total abdominal hysterectomy
s/p Hysterectomy , hypertensive urgency post op

## 2018-09-13 NOTE — PROGRESS NOTE ADULT - PROBLEM SELECTOR PLAN 2
- continue to optimize blood pressure management   - continue to monitor blood pressure readings   - continue to optimize pain management

## 2018-09-13 NOTE — PROGRESS NOTE ADULT - PROBLEM SELECTOR PROBLEM 2
Hypertension, unspecified type
Intramural, submucous, and subserous leiomyoma of uterus
Hypertension, unspecified type

## 2018-09-13 NOTE — PROGRESS NOTE ADULT - ASSESSMENT
56 y/o HD#2 POD#2 s/p total abdominal hysterectomy, currently reporting moderate pain. Otherwise stable.
57F hx migraine headache and fibroids who was admitted to GYN service for SANDRA with BSO for uterine fibroid and menorrhagia . Post op complicated with worsening of headache ( as patient state she had some headache prior surgery - Hx of migraines at was similar to prior episodes ) , found to have elevated BP ( 190) .  Medicine consult called for persistent hypertension with SBP peaking into the 180s.  Pt reporting headache located in her forehead and behind her eyes that started since the day of her surgery and has been persistent.  Headache quality similar to her migraines.  Pt denies any chest pain, dyspnea.  Pt endorses lower abdominal pain in the area of her surgery. This am  , on Hydralazine , Labetalol added this am - last /74  . Headache better this am .
Patient's BP went from 190/82 to 172/82, headache is improving. Labetalol added to hydralazine.  Will monitor BP closely.
58 y/o HD#1  POD#1 s/p SANDRA and bilateral salpingo-oopherectomy.

## 2018-09-13 NOTE — PROGRESS NOTE ADULT - SUBJECTIVE AND OBJECTIVE BOX
56 y/o HD#2 POD#2 s/p total abdominal hysterectomy.      S:  Patient was seen and examined at bedside. The patient is doing better. Pain is a 6/10 with a returning headache but had just received her next dose of pain medication. Tolerating regular diet, denies N/V. Lizama has been removed and patient has been urinating. Patient has been ambulating. Denies flatus or BM.    O:   T(C): 37 (09-12-18 @ 19:06), Max: 37.2 (09-12-18 @ 12:04)  HR: 77 (09-12-18 @ 19:06) (54 - 77)  BP: 171/79 (09-13-18 @ 05:01) (135/74 - 190/87)  RR: 20 (09-12-18 @ 19:06) (18 - 20)  SpO2: 97% (09-12-18 @ 06:30) (97% - 97%)    Abdomen: soft, nontender, + BS   Incision: clean dry and intact          09-11-18 @ 07:01  -  09-12-18 @ 07:00  --------------------------------------------------------  IN: 1200 mL / OUT: 4025 mL / NET: -2825 mL          acetaminophen 325 mG/butalbital 50 mG/caffeine 40 mG 1 Tablet(s) Oral every 8 hours PRN  bisacodyl Suppository 10 milliGRAM(s) Rectal daily  ceFAZolin   IVPB 2000 milliGRAM(s) IV Intermittent once  docusate sodium 100 milliGRAM(s) Oral three times a day PRN  hydrALAZINE 25 milliGRAM(s) Oral every 6 hours PRN  HYDROmorphone PCA (1 mG/mL) 30 milliLiter(s) PCA Continuous PCA Continuous  losartan 25 milliGRAM(s) Oral daily  oxyCODONE    5 mG/acetaminophen 325 mG 1 Tablet(s) Oral every 4 hours PRN  oxyCODONE    5 mG/acetaminophen 325 mG 2 Tablet(s) Oral every 6 hours PRN

## 2018-09-18 ENCOUNTER — APPOINTMENT (OUTPATIENT)
Dept: OBGYN | Facility: CLINIC | Age: 58
End: 2018-09-18
Payer: COMMERCIAL

## 2018-09-18 VITALS
WEIGHT: 162 LBS | BODY MASS INDEX: 30.58 KG/M2 | HEART RATE: 66 BPM | DIASTOLIC BLOOD PRESSURE: 76 MMHG | HEIGHT: 61 IN | SYSTOLIC BLOOD PRESSURE: 114 MMHG

## 2018-09-18 DIAGNOSIS — Z09 ENCOUNTER FOR FOLLOW-UP EXAMINATION AFTER COMPLETED TREATMENT FOR CONDITIONS OTHER THAN MALIGNANT NEOPLASM: ICD-10-CM

## 2018-09-18 PROCEDURE — 99024 POSTOP FOLLOW-UP VISIT: CPT

## 2018-09-19 LAB — SURGICAL PATHOLOGY FINAL REPORT - CH: SIGNIFICANT CHANGE UP

## 2018-10-16 ENCOUNTER — APPOINTMENT (OUTPATIENT)
Dept: OBGYN | Facility: CLINIC | Age: 58
End: 2018-10-16
Payer: COMMERCIAL

## 2018-10-16 DIAGNOSIS — R10.30 LOWER ABDOMINAL PAIN, UNSPECIFIED: ICD-10-CM

## 2018-10-16 PROCEDURE — 99024 POSTOP FOLLOW-UP VISIT: CPT

## 2021-06-21 NOTE — PATIENT PROFILE ADULT. - FLU SEASON?
What Type Of Note Output Would You Prefer (Optional)?: Standard Output What Is The Reason For Today's Visit?: Full Body Skin Examination What Is The Reason For Today's Visit? (Being Monitored For X): the development of new lesions How Severe Are Your Spot(S)?: moderate Yes...

## 2021-10-04 NOTE — PATIENT PROFILE ADULT. - ARRIVAL TIME
10:00 Cartilage Graft Text: The defect edges were debeveled with a #15 scalpel blade.  Given the location of the defect, shape of the defect, the fact the defect involved a full thickness cartilage defect a cartilage graft was deemed most appropriate.  An appropriate donor site was identified, cleansed, and anesthetized. The cartilage graft was then harvested and transferred to the recipient site, oriented appropriately and then sutured into place.  The secondary defect was then repaired using a primary closure.

## 2023-04-05 NOTE — PATIENT PROFILE ADULT. - TOBACCO USE
Followed by Dr. Chairez, c/o SOB for the past few days - unable to lay down, had to sit up while sleeping.
Never smoker

## 2024-07-30 NOTE — PATIENT PROFILE ADULT. - VISION (WITH CORRECTIVE LENSES IF THE PATIENT USUALLY WEARS THEM):
[Alert] : alert glasses reading/Partially impaired: cannot see medication labels or newsprint, but can see obstacles in path, and the surrounding layout; can count fingers at arm's length [No Acute Distress] : in no acute distress [Sclera] : the sclera and conjunctiva were normal [EOMI] : extraocular movements were intact [No Respiratory Distress] : no respiratory distress [No Acc Muscle Use] : no accessory muscle use [Respiration, Rhythm And Depth] : normal respiratory rhythm and effort [Normal Affect] : the affect was normal [Normal Mood] : the mood was normal

## 2024-12-30 NOTE — PATIENT PROFILE ADULT. - NSSUBSTANCEUSE_GEN_ALL_CORE_SD
Copied from CRM #[id number]. Topic:  Schedule Appointment - [facility] Schedule [facility]  >> Dec 30, 2024  1:00 PM Marie FERNANDO wrote:  Shana RIVERS RN called to schedule gastroenterology*  Following instructions listed on the Care Site Dept KB page. Checked insurance;  Selected 'Wrap Up CRM' and created new Telephone Encounter after clicking 'Convert to Clinical Call'. Selected reason for call 'Appointment'. Sent Appointment template and routed as routine priority to appropriate pool per Care Site Dept KB page. Readback full message.-- DO NOT REPLY / DO NOT REPLY ALL --  -- This inbox is not monitored. If this was sent to the wrong provider or department, reroute message to  [facility] Reroute pool. --  -- Message is from Engagement Center Operations (ECO) --  Reason for Appointment Message: Caller wants sooner PC Acute appointment - offered other approved options (partner, [facility], [facility], Quick Care Virtual Visit) and insists on PCP visit.    Reason for Visit: abdominal cramping. Patient is wondering if the second medication that was prescribed for her she would be able to get or if she needs an appointment. She was unable to take that medication due to having surgery.     Is the patient currently scheduled? No    Preferred time to be seen: none    Caller Information       Contact Date/Time Type Contact Phone/Fax    12/30/2024 12:58 PM CST Phone (Incoming) Shana RIVERS [phone]            Alternative phone number: [phone]    Can a detailed message be left?  Yes - Voicemail   Patient has been advised the message will be addressed within 2-3 business days            never used

## 2025-01-07 NOTE — H&P PST ADULT - VISION (WITH CORRECTIVE LENSES IF THE PATIENT USUALLY WEARS THEM):
1 pair Partially impaired: cannot see medication labels or newsprint, but can see obstacles in path, and the surrounding layout; can count fingers at arm's length/glasses reading